# Patient Record
Sex: MALE | Race: WHITE | NOT HISPANIC OR LATINO | Employment: UNEMPLOYED | URBAN - METROPOLITAN AREA
[De-identification: names, ages, dates, MRNs, and addresses within clinical notes are randomized per-mention and may not be internally consistent; named-entity substitution may affect disease eponyms.]

---

## 2020-12-18 ENCOUNTER — ANCILLARY PROCEDURE (OUTPATIENT)
Dept: GENERAL RADIOLOGY | Facility: CLINIC | Age: 40
End: 2020-12-18
Attending: PHYSICIAN ASSISTANT

## 2020-12-18 ENCOUNTER — OFFICE VISIT (OUTPATIENT)
Dept: URGENT CARE | Facility: URGENT CARE | Age: 40
End: 2020-12-18

## 2020-12-18 VITALS
RESPIRATION RATE: 12 BRPM | WEIGHT: 314.2 LBS | HEART RATE: 71 BPM | HEIGHT: 71 IN | TEMPERATURE: 98.1 F | OXYGEN SATURATION: 98 % | DIASTOLIC BLOOD PRESSURE: 88 MMHG | SYSTOLIC BLOOD PRESSURE: 143 MMHG | BODY MASS INDEX: 43.99 KG/M2

## 2020-12-18 DIAGNOSIS — M79.675 PAIN OF TOE OF LEFT FOOT: Primary | ICD-10-CM

## 2020-12-18 DIAGNOSIS — E66.01 MORBID OBESITY (H): ICD-10-CM

## 2020-12-18 PROCEDURE — 73660 X-RAY EXAM OF TOE(S): CPT | Mod: LT | Performed by: RADIOLOGY

## 2020-12-18 PROCEDURE — 99203 OFFICE O/P NEW LOW 30 MIN: CPT | Performed by: PHYSICIAN ASSISTANT

## 2020-12-18 SDOH — HEALTH STABILITY: MENTAL HEALTH: HOW OFTEN DO YOU HAVE 6 OR MORE DRINKS ON ONE OCCASION?: NOT ASKED

## 2020-12-18 SDOH — HEALTH STABILITY: MENTAL HEALTH: HOW OFTEN DO YOU HAVE A DRINK CONTAINING ALCOHOL?: NOT ASKED

## 2020-12-18 SDOH — HEALTH STABILITY: MENTAL HEALTH: HOW MANY STANDARD DRINKS CONTAINING ALCOHOL DO YOU HAVE ON A TYPICAL DAY?: NOT ASKED

## 2020-12-18 ASSESSMENT — MIFFLIN-ST. JEOR: SCORE: 2367.33

## 2020-12-19 NOTE — PROGRESS NOTES
"SUBJECTIVE:   Keegan Reddy is a 40 year old male presenting with a chief complaint of   Chief Complaint   Patient presents with     Toe Pain     Left great toe x 2 days. Possibly injured while helping friend move. Has been icing and elevating       He is a new patient of [facility].  Patient helped a friend  Move on 12/15 and 12/16.  Patient carrying boxes.  Patient woke up on 12/17 with pain to left foot ball of first digit.  Patient was wearing gymshoes at the time.  No trauma recalled.   No prior injury to left foot.  No hx of gout.  Patient treated with ice and ibuprofen.      Patient takes daily tums and OTC analgesics; no surgeries, Social:  No smoke in 3 cigarettes.      Review of Systems    No past medical history on file.  No family history on file.  Current Outpatient Medications   Medication Sig Dispense Refill     METHOCARBAMOL 750 MG PO TABS 1 TABLET EVERY 4 HOURS       SULINDAC 200 MG PO TABS 1 TABLET TWICE DAILY AS NEEDED (Patient not taking: No sig reported) 20 Tab 0     VICODIN 5-500 MG PO TABS 1 TABLET EVERY 4 TO 6 HOURS AS NEEDED       Social History     Tobacco Use     Smoking status: Former Smoker     Types: Cigarettes     Quit date: 7/27/2017     Years since quitting: 3.3     Smokeless tobacco: Never Used   Substance Use Topics     Alcohol use: Not Currently     Comment: rare       OBJECTIVE  BP (!) 143/88   Pulse 71   Temp 98.1  F (36.7  C) (Oral)   Resp 12   Ht 1.803 m (5' 11\")   Wt 142.5 kg (314 lb 3.2 oz)   SpO2 98%   BMI 43.82 kg/m      Physical Exam  Vitals signs and nursing note reviewed.   Constitutional:       General: He is not in acute distress.     Appearance: Normal appearance. He is obese.   Eyes:      Extraocular Movements: Extraocular movements intact.      Conjunctiva/sclera: Conjunctivae normal.   Cardiovascular:      Pulses: Normal pulses.   Musculoskeletal:      Comments: Left foot, great toe, no erythema, ecchymosis or edema.  Tenderness to palpation of ball of foot.  " ROM painful.  Cap refill normal.    Patient ambulates with the weight onto the lateral aspect of his foot and with a small limp.   Skin:     Capillary Refill: Capillary refill takes less than 2 seconds.   Neurological:      General: No focal deficit present.      Mental Status: He is alert.   Psychiatric:         Mood and Affect: Mood normal.         Labs:  Results for orders placed or performed in visit on 12/18/20 (from the past 24 hour(s))   XR Toe Left G/E 2 Views    Narrative    XR LEFT TOE TWO OR MORE VIEWS   12/18/2020 7:42 PM     HISTORY: Pain of toe of left foot    COMPARISON: None.      Impression    IMPRESSION: Three views of the great toe. Bipartite lateral sesamoid.  No evidence of acute fracture or subluxation.       X-Ray was done, my findings are: Sesamoid fx?  Xrays personally viewed by myself.      ASSESSMENT:      ICD-10-CM    1. Pain of toe of left foot  M79.675 XR Toe Left G/E 2 Views     Ankle/Foot Bracing Supplies Order for DME - ONLY FOR DME     Orthopedic & Spine  Referral   2. Morbid obesity (H)  E66.01         Medical Decision Making:    Differential Diagnosis:  sesmoid bruise, sesmoid fx    Serious Comorbid Conditions:  Adult:  as above    PLAN:    Patient placed in a short walking boot.  Podiatry referral.   Patient has ibuprofen at home and will take 800 mg TID with food.      Followup:    If not improving or if condition worsens, follow up with your Primary Care Provider, In 3  day(s) follow up with  Ortho    There are no Patient Instructions on file for this visit.

## 2021-01-04 PROBLEM — E55.9 VITAMIN D DEFICIENCY: Status: ACTIVE | Noted: 2017-02-09

## 2021-01-04 PROBLEM — F12.90 MARIJUANA USE, CONTINUOUS: Status: ACTIVE | Noted: 2017-02-09

## 2021-01-04 PROBLEM — K21.9 GASTROESOPHAGEAL REFLUX DISEASE WITHOUT ESOPHAGITIS: Status: ACTIVE | Noted: 2017-03-09

## 2021-01-05 ENCOUNTER — OFFICE VISIT (OUTPATIENT)
Dept: PODIATRY | Facility: CLINIC | Age: 41
End: 2021-01-05
Attending: PHYSICIAN ASSISTANT

## 2021-01-05 VITALS
SYSTOLIC BLOOD PRESSURE: 144 MMHG | HEART RATE: 70 BPM | DIASTOLIC BLOOD PRESSURE: 82 MMHG | BODY MASS INDEX: 43.79 KG/M2 | WEIGHT: 314 LBS

## 2021-01-05 DIAGNOSIS — M79.675 PAIN OF TOE OF LEFT FOOT: ICD-10-CM

## 2021-01-05 PROCEDURE — 99203 OFFICE O/P NEW LOW 30 MIN: CPT | Performed by: PODIATRIST

## 2021-01-05 RX ORDER — IBUPROFEN 800 MG/1
TABLET, FILM COATED ORAL
COMMUNITY
Start: 2020-03-17

## 2021-01-05 NOTE — LETTER
1/5/2021         RE: Keegan Reddy  [address]  [place] [address]        Dear Colleague,    Thank you for referring your patient, Keegan Reddy, to the [facility]. Please see a copy of my visit note below.     Subjective:    Pt is seen today in consult from Aga Sears with the c/c of painful right foot.  On 12/16/2020 this patient was helping his friend move.  The patient states that he jumped a little bit wearing gym shoes and he describes a sudden hyper dorsiflexion of the left hallux.  He immediately had pain on the dorsum of this joint.  Pain aggravated by activity and relieved by rest.  He had slight edema here.  He had no ecchymosis or erythema.  He denies any weakness.  He denies any increased deformity.  It is getting better but slowly and he is concerned about this.  He denies any past history of injury here before.    ROS:  A 10-point review of systems was performed and is positive for that noted in the HPI and as seen above.  All other areas are negative.        No Known Allergies    Current Outpatient Medications   Medication Sig Dispense Refill     ibuprofen (ADVIL/MOTRIN) 800 MG tablet TAKE ONE TABLET BY MOUTH EVERY EIGHT HOURS AS NEEDED FOR PAIN         Patient Active Problem List   Diagnosis     Morbid obesity (H)     Gastroesophageal reflux disease without esophagitis     Marijuana use, continuous     Vitamin D deficiency       No past medical history on file.    No past surgical history on file.    No family history on file.    Social History     Tobacco Use     Smoking status: Former Smoker     Types: Cigarettes     Quit date: 7/27/2017     Years since quitting: 3.4     Smokeless tobacco: Never Used   Substance Use Topics     Alcohol use: Not Currently     Comment: rare         Exam:    Vitals: BP (!) 144/82   Pulse 70   Wt 142.4 kg (314 lb)   BMI 43.79 kg/m    BMI: Body mass index is 43.79 kg/m .  Height: Data Unavailable    Constitutional/  general:  Pt is in no apparent distress, appears well-nourished.  Cooperative with history and physical exam.     Psych:  The patient answered questions appropriately.  Normal affect.  Seems to have reasonable expectations, in terms of treatment.     Eyes:  Visual scanning/ tracking without deficit.     Ears:  Response to auditory stimuli is normal.    Auricles in proper alignment.     Lymphatic:  Popliteal lymph nodes not enlarged.     Lungs:  Non labored breathing, non labored speech. No cough.  No audible wheezing. Even, quiet breathing.       Vascular:  positive pedal pulses bilaterally for both the DP and PT arteries.  CFT < 3 sec.  negative ankle edema.  positive pedal hair growth.    Neuro:  Alert and oriented x 3. Coordinated gait.  Light touch sensation is intact to the L4, L5, S1 distributions. No obvious deficits.  No evidence of neurological-based weakness, spasticity, or contracture in the lower extremities.      Derm: Normal texture and turgor.  No erythema, ecchymosis, or cyanosis.      Musculoskeletal:    Lower extremity muscle strength is normal.  Patient is ambulatory without an assistive device or brace.  No gross deformities.  Normal arch.    Normal ROM all forefoot and rearfoot joints.  Left hallux extensor and flexor tendons intact and no pain with stressing these.  No pain on the sesamoids.  No pain on the second ray.  No pain medial left first MTPJ.  Slight pain dorsum left first MTPJ.  Slight pain at end range dorsiflexion.  No erythema edema ecchymosis or masses are noted.    X-rays are unremarkable in area of injury    Assessment: Left first MTPJ turf toe injury    Plan:  X-rays from past were personally reviewed.  Explained the mechanism of this injury and that it can be slow to heal.  Patient instructed to give this more time.   he will slowly increase his activities as tolerated.  Tylenol for pain.  If not improving physical therapy is an option.  RTC as needed.  Thank you for allowing  me participate in the care of this patient.        Keegan Camargo DPM DPM, FACFAS         Again, thank you for allowing me to participate in the care of your patient.        Sincerely,        Keegan Camargo DPM

## 2021-01-05 NOTE — PATIENT INSTRUCTIONS
We wish you continued good healing. If you have any questions or concerns, please do not hesitate to contact us at [phone]    [employer] (secure e-mail communication and access to your chart) to send a message or to make an appointment.    Please remember to call and schedule a follow up appointment if one was recommended at your earliest convenience.     +++OF MARCH 2020+++ LOCATION AND HOURS HAVE CHANGED    PLEASE CALL CLINICS TO VERIFY DAYS AND TIMES  PODIATRY CLINIC HOURS  TELEPHONE NUMBER    Dr. Keegan WOOPMANUELA [facility]        Clinics:  [place] [facility]   Tuesday 1PM-6PM  [place]  Wednesday 745AM-330PM  [place]  Thursday/Friday 745AM-230PM  [place] [facility]  [phone]    [place] APPOINTMENTS  [phone]          If you need a medication refill, please contact us you may need lab work and/or a follow up visit prior to your refill (i.e. Antifungal medications).    If MRI needed please call Imaging at [phone] or [phone]    HOW DO I GET MY KNEE SCOOTER? Knee scooters can be picked up at ANY Medical Supply stores with your knee scooter Prescription.  OR    Bring your signed prescription to an [facility] showroom.

## 2021-01-06 NOTE — PROGRESS NOTES
Subjective:    Pt is seen today in consult from Aga Sears with the c/c of painful right foot.  On 12/16/2020 this patient was helping his friend move.  The patient states that he jumped a little bit wearing gym shoes and he describes a sudden hyper dorsiflexion of the left hallux.  He immediately had pain on the dorsum of this joint.  Pain aggravated by activity and relieved by rest.  He had slight edema here.  He had no ecchymosis or erythema.  He denies any weakness.  He denies any increased deformity.  It is getting better but slowly and he is concerned about this.  He denies any past history of injury here before.    ROS:  A 10-point review of systems was performed and is positive for that noted in the HPI and as seen above.  All other areas are negative.        No Known Allergies    Current Outpatient Medications   Medication Sig Dispense Refill     ibuprofen (ADVIL/MOTRIN) 800 MG tablet TAKE ONE TABLET BY MOUTH EVERY EIGHT HOURS AS NEEDED FOR PAIN         Patient Active Problem List   Diagnosis     Morbid obesity (H)     Gastroesophageal reflux disease without esophagitis     Marijuana use, continuous     Vitamin D deficiency       No past medical history on file.    No past surgical history on file.    No family history on file.    Social History     Tobacco Use     Smoking status: Former Smoker     Types: Cigarettes     Quit date: 7/27/2017     Years since quitting: 3.4     Smokeless tobacco: Never Used   Substance Use Topics     Alcohol use: Not Currently     Comment: rare         Exam:    Vitals: BP (!) 144/82   Pulse 70   Wt 142.4 kg (314 lb)   BMI 43.79 kg/m    BMI: Body mass index is 43.79 kg/m .  Height: Data Unavailable    Constitutional/ general:  Pt is in no apparent distress, appears well-nourished.  Cooperative with history and physical exam.     Psych:  The patient answered questions appropriately.  Normal affect.  Seems to have reasonable expectations, in terms of treatment.     Eyes:   Visual scanning/ tracking without deficit.     Ears:  Response to auditory stimuli is normal.    Auricles in proper alignment.     Lymphatic:  Popliteal lymph nodes not enlarged.     Lungs:  Non labored breathing, non labored speech. No cough.  No audible wheezing. Even, quiet breathing.       Vascular:  positive pedal pulses bilaterally for both the DP and PT arteries.  CFT < 3 sec.  negative ankle edema.  positive pedal hair growth.    Neuro:  Alert and oriented x 3. Coordinated gait.  Light touch sensation is intact to the L4, L5, S1 distributions. No obvious deficits.  No evidence of neurological-based weakness, spasticity, or contracture in the lower extremities.      Derm: Normal texture and turgor.  No erythema, ecchymosis, or cyanosis.      Musculoskeletal:    Lower extremity muscle strength is normal.  Patient is ambulatory without an assistive device or brace.  No gross deformities.  Normal arch.    Normal ROM all forefoot and rearfoot joints.  Left hallux extensor and flexor tendons intact and no pain with stressing these.  No pain on the sesamoids.  No pain on the second ray.  No pain medial left first MTPJ.  Slight pain dorsum left first MTPJ.  Slight pain at end range dorsiflexion.  No erythema edema ecchymosis or masses are noted.    X-rays are unremarkable in area of injury    Assessment: Left first MTPJ turf toe injury    Plan:  X-rays from past were personally reviewed.  Explained the mechanism of this injury and that it can be slow to heal.  Patient instructed to give this more time.   he will slowly increase his activities as tolerated.  Tylenol for pain.  If not improving physical therapy is an option.  RTC as needed.  Thank you for allowing me participate in the care of this patient.        Keegan Camargo, ERIC REYES, FACFAS

## 2021-10-15 ENCOUNTER — VIRTUAL VISIT (OUTPATIENT)
Dept: FAMILY MEDICINE | Facility: CLINIC | Age: 41
End: 2021-10-15

## 2021-10-15 DIAGNOSIS — Z53.9 ERRONEOUS ENCOUNTER--DISREGARD: Primary | ICD-10-CM

## 2024-02-18 ENCOUNTER — OFFICE VISIT (OUTPATIENT)
Dept: URGENT CARE | Facility: URGENT CARE | Age: 44
End: 2024-02-18
Payer: MEDICAID

## 2024-02-18 VITALS
TEMPERATURE: 98.8 F | WEIGHT: 304 LBS | HEART RATE: 74 BPM | SYSTOLIC BLOOD PRESSURE: 105 MMHG | DIASTOLIC BLOOD PRESSURE: 75 MMHG | OXYGEN SATURATION: 96 % | RESPIRATION RATE: 14 BRPM | BODY MASS INDEX: 42.4 KG/M2

## 2024-02-18 DIAGNOSIS — K04.7 DENTAL INFECTION: Primary | ICD-10-CM

## 2024-02-18 DIAGNOSIS — K08.89 TOOTH PAIN: ICD-10-CM

## 2024-02-18 PROCEDURE — 99203 OFFICE O/P NEW LOW 30 MIN: CPT

## 2024-02-18 RX ORDER — NAPROXEN 500 MG/1
500 TABLET ORAL 2 TIMES DAILY WITH MEALS
Qty: 60 TABLET | Refills: 0 | Status: SHIPPED | OUTPATIENT
Start: 2024-02-18

## 2024-02-18 NOTE — PROGRESS NOTES
Assessment & Plan   (K04.7) Dental infection  (primary encounter diagnosis)  Plan: amoxicillin-clavulanate (AUGMENTIN) 875-125 MG         tablet    (K08.89) Tooth pain  Plan: naproxen (NAPROSYN) 500 MG tablet          Informed the patient to take the antibiotic as prescribed and finish the full course even if symptoms improve.  We discussed trying yogurt with active cultures or probiotic such as Culturelle daily to help around diarrhea while taking the antibiotic.  We also discussed taking naproxen with food for the tooth pain.  Instructed the patient to follow-up with a dentist for recheck and further evaluation/treatment.  Patient acknowledged his understanding of the above plan.    The use of Dragon/DishOpinion dictation services may have been used to construct the content in this note; any grammatical or spelling errors are non-intentional. Please contact the author of this note directly if you are in need of any clarification.      NELSON Blackburn CNP  [facility] [place]    Krys Allison is a 43 year old male who presents to clinic today for the following health issues:  Chief Complaint   Patient presents with    Urgent Care     Present for swelling with pain of mouth/teeth for two days.   Patient request antibiotic for infection.     HPI  Patient reports worsening teeth pain and gum swelling over the past 2 days.  He states specifically the left lower and bilateral upper last molars as symptomatic.  He also states that he will be contacting dental offices tomorrow for a follow-up appointment.    ROS:  Negative except noted above.    Review of Systems        Objective    /75   Pulse 74   Temp 98.8  F (37.1  C) (Tympanic)   Resp 14   Wt 137.9 kg (304 lb)   SpO2 96%   BMI 42.40 kg/m    Physical Exam   GENERAL: alert and no distress  EYES: Eyes grossly normal to inspection, PERRL and conjunctivae and sclerae normal  HENT: Poor dentition.  Gingival erythema and edema  near the upper last left molar.  No drainage or discharge noted.  NECK: no adenopathy, no asymmetry, masses, or scars  SKIN: no suspicious lesions or rashes

## 2024-02-18 NOTE — PATIENT INSTRUCTIONS
Take the antibiotic as prescribed and finish the full course even if symptoms improve.  Try yogurt with active cultures or probiotics such as Culturelle daily to help prevent diarrhea while using antibiotics.  Take naproxen with food for your tooth pain.  Follow up with a dentist for a recheck and further evaluation/treatment.